# Patient Record
Sex: FEMALE | Race: OTHER | NOT HISPANIC OR LATINO | URBAN - METROPOLITAN AREA
[De-identification: names, ages, dates, MRNs, and addresses within clinical notes are randomized per-mention and may not be internally consistent; named-entity substitution may affect disease eponyms.]

---

## 2017-10-24 ENCOUNTER — EMERGENCY (EMERGENCY)
Facility: HOSPITAL | Age: 27
LOS: 1 days | Discharge: HOME | End: 2017-10-24
Attending: EMERGENCY MEDICINE | Admitting: EMERGENCY MEDICINE
Payer: COMMERCIAL

## 2017-10-24 VITALS
HEART RATE: 102 BPM | DIASTOLIC BLOOD PRESSURE: 97 MMHG | TEMPERATURE: 99 F | SYSTOLIC BLOOD PRESSURE: 102 MMHG | OXYGEN SATURATION: 97 % | RESPIRATION RATE: 20 BRPM

## 2017-10-24 VITALS
RESPIRATION RATE: 18 BRPM | HEART RATE: 132 BPM | DIASTOLIC BLOOD PRESSURE: 74 MMHG | TEMPERATURE: 97 F | OXYGEN SATURATION: 100 % | SYSTOLIC BLOOD PRESSURE: 108 MMHG

## 2017-10-24 DIAGNOSIS — Z79.2 LONG TERM (CURRENT) USE OF ANTIBIOTICS: ICD-10-CM

## 2017-10-24 DIAGNOSIS — R06.00 DYSPNEA, UNSPECIFIED: ICD-10-CM

## 2017-10-24 DIAGNOSIS — R06.02 SHORTNESS OF BREATH: ICD-10-CM

## 2017-10-24 LAB
ALBUMIN SERPL ELPH-MCNC: 4.2 G/DL — SIGNIFICANT CHANGE UP (ref 3.3–5)
ALP SERPL-CCNC: 49 U/L — SIGNIFICANT CHANGE UP (ref 40–120)
ALT FLD-CCNC: 13 U/L — SIGNIFICANT CHANGE UP (ref 10–45)
ANION GAP SERPL CALC-SCNC: 21 MMOL/L — HIGH (ref 5–17)
APPEARANCE UR: CLEAR — SIGNIFICANT CHANGE UP
APTT BLD: 32.9 SEC — SIGNIFICANT CHANGE UP (ref 27.5–37.4)
AST SERPL-CCNC: 19 U/L — SIGNIFICANT CHANGE UP (ref 10–40)
BASOPHILS NFR BLD AUTO: 1 % — SIGNIFICANT CHANGE UP (ref 0–2)
BILIRUB SERPL-MCNC: <0.2 MG/DL — SIGNIFICANT CHANGE UP (ref 0.2–1.2)
BILIRUB UR-MCNC: NEGATIVE — SIGNIFICANT CHANGE UP
BUN SERPL-MCNC: 16 MG/DL — SIGNIFICANT CHANGE UP (ref 7–23)
CALCIUM SERPL-MCNC: 9.6 MG/DL — SIGNIFICANT CHANGE UP (ref 8.4–10.5)
CHLORIDE SERPL-SCNC: 96 MMOL/L — SIGNIFICANT CHANGE UP (ref 96–108)
CK SERPL-CCNC: 115 U/L — SIGNIFICANT CHANGE UP (ref 25–170)
CO2 SERPL-SCNC: 17 MMOL/L — LOW (ref 22–31)
COLOR SPEC: YELLOW — SIGNIFICANT CHANGE UP
CREAT SERPL-MCNC: 0.93 MG/DL — SIGNIFICANT CHANGE UP (ref 0.5–1.3)
D DIMER BLD IA.RAPID-MCNC: <150 NG/ML DDU — SIGNIFICANT CHANGE UP
DIFF PNL FLD: (no result)
EOSINOPHIL NFR BLD AUTO: 1 % — SIGNIFICANT CHANGE UP (ref 0–6)
GAS PNL BLDV: SIGNIFICANT CHANGE UP
GLUCOSE SERPL-MCNC: 126 MG/DL — HIGH (ref 70–99)
GLUCOSE UR QL: NEGATIVE — SIGNIFICANT CHANGE UP
HCT VFR BLD CALC: 36.7 % — SIGNIFICANT CHANGE UP (ref 34.5–45)
HGB BLD-MCNC: 12.4 G/DL — SIGNIFICANT CHANGE UP (ref 11.5–15.5)
INR BLD: 1.04 — SIGNIFICANT CHANGE UP (ref 0.88–1.16)
KETONES UR-MCNC: NEGATIVE — SIGNIFICANT CHANGE UP
LEUKOCYTE ESTERASE UR-ACNC: NEGATIVE — SIGNIFICANT CHANGE UP
LYMPHOCYTES # BLD AUTO: 78 % — HIGH (ref 13–44)
MCHC RBC-ENTMCNC: 29 PG — SIGNIFICANT CHANGE UP (ref 27–34)
MCHC RBC-ENTMCNC: 33.8 G/DL — SIGNIFICANT CHANGE UP (ref 32–36)
MCV RBC AUTO: 85.7 FL — SIGNIFICANT CHANGE UP (ref 80–100)
MONOCYTES NFR BLD AUTO: 5 % — SIGNIFICANT CHANGE UP (ref 2–14)
NEUTROPHILS NFR BLD AUTO: 15 % — LOW (ref 43–77)
NITRITE UR-MCNC: NEGATIVE — SIGNIFICANT CHANGE UP
NT-PROBNP SERPL-SCNC: 27 PG/ML — SIGNIFICANT CHANGE UP (ref 0–300)
PCP SPEC-MCNC: SIGNIFICANT CHANGE UP
PH UR: 6 — SIGNIFICANT CHANGE UP (ref 5–8)
PLATELET # BLD AUTO: 244 K/UL — SIGNIFICANT CHANGE UP (ref 150–400)
POTASSIUM SERPL-MCNC: 3.5 MMOL/L — SIGNIFICANT CHANGE UP (ref 3.5–5.3)
POTASSIUM SERPL-SCNC: 3.5 MMOL/L — SIGNIFICANT CHANGE UP (ref 3.5–5.3)
PROT SERPL-MCNC: 8 G/DL — SIGNIFICANT CHANGE UP (ref 6–8.3)
PROT UR-MCNC: NEGATIVE MG/DL — SIGNIFICANT CHANGE UP
PROTHROM AB SERPL-ACNC: 11.5 SEC — SIGNIFICANT CHANGE UP (ref 9.8–12.7)
RBC # BLD: 4.28 M/UL — SIGNIFICANT CHANGE UP (ref 3.8–5.2)
RBC # FLD: 12.6 % — SIGNIFICANT CHANGE UP (ref 10.3–16.9)
SODIUM SERPL-SCNC: 134 MMOL/L — LOW (ref 135–145)
SP GR SPEC: <=1.005 — SIGNIFICANT CHANGE UP (ref 1–1.03)
TROPONIN T SERPL-MCNC: <0.01 NG/ML — SIGNIFICANT CHANGE UP (ref 0–0.01)
TSH SERPL-MCNC: 1.22 UIU/ML — SIGNIFICANT CHANGE UP (ref 0.35–4.94)
UROBILINOGEN FLD QL: 0.2 E.U./DL — SIGNIFICANT CHANGE UP
WBC # BLD: 7.6 K/UL — SIGNIFICANT CHANGE UP (ref 3.8–10.5)
WBC # FLD AUTO: 7.6 K/UL — SIGNIFICANT CHANGE UP (ref 3.8–10.5)

## 2017-10-24 PROCEDURE — 71010: CPT | Mod: 26

## 2017-10-24 PROCEDURE — 96375 TX/PRO/DX INJ NEW DRUG ADDON: CPT

## 2017-10-24 PROCEDURE — 82550 ASSAY OF CK (CPK): CPT

## 2017-10-24 PROCEDURE — 85610 PROTHROMBIN TIME: CPT

## 2017-10-24 PROCEDURE — 99285 EMERGENCY DEPT VISIT HI MDM: CPT | Mod: 25

## 2017-10-24 PROCEDURE — 82330 ASSAY OF CALCIUM: CPT

## 2017-10-24 PROCEDURE — 71045 X-RAY EXAM CHEST 1 VIEW: CPT

## 2017-10-24 PROCEDURE — 82803 BLOOD GASES ANY COMBINATION: CPT

## 2017-10-24 PROCEDURE — 96374 THER/PROPH/DIAG INJ IV PUSH: CPT

## 2017-10-24 PROCEDURE — 80053 COMPREHEN METABOLIC PANEL: CPT

## 2017-10-24 PROCEDURE — 71010: CPT | Mod: 26,NC

## 2017-10-24 PROCEDURE — 83880 ASSAY OF NATRIURETIC PEPTIDE: CPT

## 2017-10-24 PROCEDURE — 84132 ASSAY OF SERUM POTASSIUM: CPT

## 2017-10-24 PROCEDURE — 84295 ASSAY OF SERUM SODIUM: CPT

## 2017-10-24 PROCEDURE — 81001 URINALYSIS AUTO W/SCOPE: CPT

## 2017-10-24 PROCEDURE — 85025 COMPLETE CBC W/AUTO DIFF WBC: CPT

## 2017-10-24 PROCEDURE — 36415 COLL VENOUS BLD VENIPUNCTURE: CPT

## 2017-10-24 PROCEDURE — 93010 ELECTROCARDIOGRAM REPORT: CPT | Mod: NC

## 2017-10-24 PROCEDURE — 84484 ASSAY OF TROPONIN QUANT: CPT

## 2017-10-24 PROCEDURE — 85730 THROMBOPLASTIN TIME PARTIAL: CPT

## 2017-10-24 PROCEDURE — 85379 FIBRIN DEGRADATION QUANT: CPT

## 2017-10-24 PROCEDURE — 80307 DRUG TEST PRSMV CHEM ANLYZR: CPT

## 2017-10-24 PROCEDURE — 99284 EMERGENCY DEPT VISIT MOD MDM: CPT | Mod: 25

## 2017-10-24 PROCEDURE — 84443 ASSAY THYROID STIM HORMONE: CPT

## 2017-10-24 PROCEDURE — 93005 ELECTROCARDIOGRAM TRACING: CPT

## 2017-10-24 RX ORDER — FAMOTIDINE 10 MG/ML
20 INJECTION INTRAVENOUS ONCE
Qty: 0 | Refills: 0 | Status: COMPLETED | OUTPATIENT
Start: 2017-10-24 | End: 2017-10-24

## 2017-10-24 RX ORDER — SODIUM CHLORIDE 9 MG/ML
2000 INJECTION INTRAMUSCULAR; INTRAVENOUS; SUBCUTANEOUS ONCE
Qty: 0 | Refills: 0 | Status: COMPLETED | OUTPATIENT
Start: 2017-10-24 | End: 2017-10-24

## 2017-10-24 RX ORDER — SODIUM CHLORIDE 9 MG/ML
3 INJECTION INTRAMUSCULAR; INTRAVENOUS; SUBCUTANEOUS ONCE
Qty: 0 | Refills: 0 | Status: COMPLETED | OUTPATIENT
Start: 2017-10-24 | End: 2017-10-24

## 2017-10-24 RX ORDER — DIPHENHYDRAMINE HCL 50 MG
50 CAPSULE ORAL ONCE
Qty: 0 | Refills: 0 | Status: COMPLETED | OUTPATIENT
Start: 2017-10-24 | End: 2017-10-24

## 2017-10-24 RX ADMIN — Medication 125 MILLIGRAM(S): at 21:21

## 2017-10-24 RX ADMIN — SODIUM CHLORIDE 2000 MILLILITER(S): 9 INJECTION INTRAMUSCULAR; INTRAVENOUS; SUBCUTANEOUS at 21:05

## 2017-10-24 RX ADMIN — Medication 50 MILLIGRAM(S): at 21:20

## 2017-10-24 RX ADMIN — SODIUM CHLORIDE 3 MILLILITER(S): 9 INJECTION INTRAMUSCULAR; INTRAVENOUS; SUBCUTANEOUS at 21:00

## 2017-10-24 RX ADMIN — FAMOTIDINE 20 MILLIGRAM(S): 10 INJECTION INTRAVENOUS at 21:20

## 2017-10-24 NOTE — ED PROVIDER NOTE - PROGRESS NOTE DETAILS
pt states her sob is improved after benadryl/steroids/pepcid. however, still tachycardic, given IV Fluids. upon further questioning; admits to possible thc usage. + utox thc positive, labs otherwise unremarkable, will repeat vs and discharge home. thc positive, labs otherwise unremarkable, will repeat vs and discharge home. low co2 likely due to hyperventilation due to anxiety.

## 2017-10-24 NOTE — ED ADULT NURSE NOTE - OBJECTIVE STATEMENT
Pt c/o sudden onset SOB with chest tightness and "a feeling like I cant catch my breath." Pt also reports "my lips feel funny and my throat." Lungs clear to auscultation. Pt placed on cardiac monitor, pulse ox, EKG performed, will monitor and maintain safety. Family at bedside.

## 2017-10-24 NOTE — ED PROVIDER NOTE - OBJECTIVE STATEMENT
27F visiting from [place] presenting with sudden onset palpitations, difficulty breathing while eating dinner, was having lobster, felt perioral tingling. States she has chest pressure, on IUD. Pt states she feels anxious. Had chocolate before dinner, has had chocolate before. Thinks chocolate may have been mixed up with mother's chocolate which contains THC (mother has CA). No leg swelling, recent travel from . No malignancy hx, no recent surg. No lip/tongue swelling, no rash. No itchiness. Pt states she thinks throat is closing.

## 2017-10-24 NOTE — ED PROVIDER NOTE - MEDICAL DECISION MAKING DETAILS
27F with chest pain, shortness of breath, sudden onset while eating dinner, initial concern for allergic rxn and treated with benadryl, solumedrol, pepcid as pt described lip/tongue tingling while eating lobster. However, no rash to skin, no n/v/d, no lip/tongue swelling. Stated throat closed although oropharynx wnl and speaking in full sentences. Lungs clear doubt asthma, doubt PE, pt has IUD but given recent travel, sent d-dimer neg acute. Pt's EKG shows sinus tach only troponin negative. Doubt PTX, neg CXR. Doubt PE neg d-dimer. TSH wnl. After 1-2 hours in ED, pt's boyfriend states that potentially chocolate was laced with THC, + UTox found. Likely intoxication. No indication for further testing, given IV fluids with improvement of tachycardia, no longer having cp/sob.     Will dc home to f/u with PCP.

## 2017-10-24 NOTE — ED PROVIDER NOTE - ENMT, MLM
Airway patent, Nasal mucosa clear. Mouth with normal mucosa. no lip/tongue swelling, oropharynx clear talking in full sentences

## 2024-09-04 ENCOUNTER — APPOINTMENT (OUTPATIENT)
Dept: SURGERY | Facility: CLINIC | Age: 34
End: 2024-09-04
Payer: COMMERCIAL

## 2024-09-04 VITALS — TEMPERATURE: 98.3 F | DIASTOLIC BLOOD PRESSURE: 71 MMHG | HEART RATE: 80 BPM | SYSTOLIC BLOOD PRESSURE: 105 MMHG

## 2024-09-04 DIAGNOSIS — G43.909 MIGRAINE, UNSPECIFIED, NOT INTRACTABLE, W/OUT STATUS MIGRAINOSUS: ICD-10-CM

## 2024-09-04 DIAGNOSIS — Z86.2 PERSONAL HISTORY OF DISEASES OF THE BLOOD AND BLOOD-FORMING ORGANS AND CERTAIN DISORDERS INVOLVING THE IMMUNE MECHANISM: ICD-10-CM

## 2024-09-04 DIAGNOSIS — Z86.69 PERSONAL HISTORY OF OTHER DISEASES OF THE NERVOUS SYSTEM AND SENSE ORGANS: ICD-10-CM

## 2024-09-04 DIAGNOSIS — Z86.018 PERSONAL HISTORY OF OTHER BENIGN NEOPLASM: ICD-10-CM

## 2024-09-04 DIAGNOSIS — E04.1 NONTOXIC SINGLE THYROID NODULE: ICD-10-CM

## 2024-09-04 PROCEDURE — 99203 OFFICE O/P NEW LOW 30 MIN: CPT

## 2024-09-04 RX ORDER — THYROID, PORCINE 15 MG/1
15 TABLET ORAL
Refills: 0 | Status: ACTIVE | COMMUNITY

## 2024-09-04 RX ORDER — SAW/PYGEUM/BETA/HERB/D3/B6/ZN 30 MG-25MG
CAPSULE ORAL
Refills: 0 | Status: ACTIVE | COMMUNITY

## 2024-09-04 NOTE — PHYSICAL EXAM
[Respiratory Effort] : normal respiratory effort [Normal Rate and Rhythm] : normal rate and rhythm [No Rash or Lesion] : No rash or lesion [Calm] : calm [de-identified] : NAD, well-appearing [de-identified] : anicteric [de-identified] : speaking in a normal voice; bedside US with difficulty in visualizing described nodule vs LNs, but no suspicious masses seen [de-identified] : soft, nondistended

## 2024-09-04 NOTE — HISTORY OF PRESENT ILLNESS
[de-identified] : 32 yo F with h/o Hashimoto's thyroiditis on [place] thyroid, presents for findings of a nodule on US. She reports having symptoms of internal tremors in her arms, legs, neck. She was concerned it was from her thyroid so she had US done. This showed a  4x4mm TR4 nodule in the RUP. There was no lymphadenopathy. Patient has no family history of endocrine disease. She speaks in a normal voice. She recently relocated from [place]. Her last available labs from 8/14/24 show a TSH of 0.87 with fT4 of 1.0.

## 2024-09-04 NOTE — ASSESSMENT
[FreeTextEntry1] : 34 yo F with 4mm thyroid nodule in setting of Hashimoto's thyroiditis.  We had a long discussion about thyroid anatomy, physiology and pathophysiology. We discussed the role of fine needle aspiration biopsies. Given the size of the nodule, I do not recommend FNA at this time. Recommend monitoring with serial US and FNA once size is 1cm or greater. Also referred to local endocrinologist as she had a virtual MD only and she needs assistance in titrating thyroid medication to alleviate symptoms. Follow up 6 months with repeat US.

## 2024-11-01 NOTE — ED PROVIDER NOTE - X-RAY INTERPRETATION
Patient is a 48 y.o. female who is here for a physical.  Chief Complaint   Patient presents with    Annual Exam         HPI:    Here for CPE.     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       Past Surgical History:   Procedure Laterality Date    COLONOSCOPY      REDUCTION MAMMAPLASTY Bilateral 2010    TOTAL LAPAROSCOPIC HYSTERECTOMY SALPINGECTOMY N/A 01/18/2023    Procedure: TOTAL LAPAROSCOPIC HYSTERECTOMY BILATERAL SALPINGECTOMY;  Surgeon: Promise Dewitt MD;  Location: [facility];  Service: Obstetrics/Gynecology;  Laterality: N/A;    VAGINAL HYSTERECTOMY  1/18/23    With bilateral salpingectomy    WISDOM TOOTH EXTRACTION  1994       Current Outpatient Medications on File Prior to Visit   Medication Sig    amphetamine-dextroamphetamine XR (Adderall XR) 10 MG 24 hr capsule Take 1 capsule by mouth Every Morning    Cholecalciferol (Vitamin D) 50 MCG (2000 UT) tablet Take 1 tablet by mouth Daily.    clobetasol (TEMOVATE) 0.05 % ointment Apply 1 Application topically to the appropriate area as directed 2 (Two) Times a Day As Needed (RASH).    escitalopram (Lexapro) 5 MG tablet Take 1 tablet by mouth Daily.    ibuprofen (ADVIL,MOTRIN) 600 MG tablet Take 1 tablet by mouth Every 6 (Six) Hours As Needed for Mild Pain.    traZODone (DESYREL) 50 MG tablet Take 1 tablet by mouth At Night As Needed for Sleep.     No current facility-administered medications on file prior to visit.       Family History   Problem Relation Age of Onset    Diabetes Father     Heart attack Father     Coronary artery disease Father         MI age 73    Crohn's disease Father     Breast  "cancer Neg Hx     Ovarian cancer Neg Hx     Endometrial cancer Neg Hx        Social History     Socioeconomic History    Marital status:    Tobacco Use    Smoking status: Never    Smokeless tobacco: Never   Vaping Use    Vaping status: Never Used   Substance and Sexual Activity    Alcohol use: Yes     Comment: RARE    Drug use: No    Sexual activity: Yes     Partners: Male     Birth control/protection: Condom, OCP         Review of Systems   Constitutional:  Negative for chills, fatigue, fever and unexpected weight change.   HENT:  Negative for congestion, ear pain, hearing loss, rhinorrhea, sinus pressure, sore throat and trouble swallowing.    Eyes:  Negative for discharge and itching.   Respiratory:  Negative for cough, chest tightness and shortness of breath.    Cardiovascular:  Negative for chest pain, palpitations and leg swelling.   Gastrointestinal:  Negative for abdominal pain, blood in stool, constipation and vomiting.        2019 colonoscopy normal   Endocrine: Negative for polydipsia and polyuria.   Genitourinary:  Negative for difficulty urinating, dysuria, enuresis, frequency, hematuria and urgency.        4/22 mammogram   Musculoskeletal:  Negative for arthralgias, back pain, gait problem and joint swelling.   Skin:  Negative for rash and wound.   Allergic/Immunologic: Positive for environmental allergies. Negative for immunocompromised state.   Neurological:  Negative for dizziness, syncope, weakness, light-headedness, numbness and headaches.   Hematological:  Does not bruise/bleed easily.   Psychiatric/Behavioral:  Positive for decreased concentration (better) and dysphoric mood (better). Negative for behavioral problems and sleep disturbance. The patient is nervous/anxious (better).        /64   Pulse 68   Ht 167.6 cm (65.98\")   Wt 54 kg (119 lb)   LMP 01/04/2023 (Approximate)   SpO2 99%   BMI 19.22 kg/m²       Physical Exam  Constitutional:       Appearance: Normal appearance. " She is well-developed.   HENT:      Head: Normocephalic and atraumatic.      Right Ear: External ear normal.      Left Ear: External ear normal.      Nose: Nose normal.      Mouth/Throat:      Mouth: Mucous membranes are moist.      Pharynx: Oropharynx is clear.   Eyes:      Extraocular Movements: Extraocular movements intact.      Conjunctiva/sclera: Conjunctivae normal.      Pupils: Pupils are equal, round, and reactive to light.   Cardiovascular:      Rate and Rhythm: Normal rate and regular rhythm.      Pulses: Normal pulses.      Heart sounds: Normal heart sounds.   Pulmonary:      Effort: Pulmonary effort is normal.      Breath sounds: Normal breath sounds.   Abdominal:      General: Abdomen is flat. Bowel sounds are normal.      Palpations: Abdomen is soft.   Musculoskeletal:         General: Normal range of motion.      Cervical back: Normal range of motion and neck supple.   Lymphadenopathy:      Cervical: No cervical adenopathy.   Skin:     General: Skin is warm and dry.   Neurological:      General: No focal deficit present.      Mental Status: She is alert and oriented to person, place, and time.   Psychiatric:         Mood and Affect: Mood normal.         Behavior: Behavior normal.         Thought Content: Thought content normal.      Comments: tearful         Procedure:      Historical Data:    HME-counseled on diet and exercise  Poor concentration-labs today, Adult ADHD self-report scale note, patient was on low dose Adderal in the past with good results.   Situational anxiety-good control with Lexapro     Reviewed the following with the patient: advised patient to avoid alcoholic beverages and encouraged patient to exercise 5-7 days per week for 30 minutes at a time.           Current Outpatient Medications:     amphetamine-dextroamphetamine XR (Adderall XR) 10 MG 24 hr capsule, Take 1 capsule by mouth Every Morning, Disp: 30 capsule, Rfl: 0    Cholecalciferol (Vitamin D) 50 MCG (2000 UT) tablet,  Take 1 tablet by mouth Daily., Disp: , Rfl:     clobetasol (TEMOVATE) 0.05 % ointment, Apply 1 Application topically to the appropriate area as directed 2 (Two) Times a Day As Needed (RASH)., Disp: 60 g, Rfl: 5    escitalopram (Lexapro) 5 MG tablet, Take 1 tablet by mouth Daily., Disp: 30 tablet, Rfl: 5    ibuprofen (ADVIL,MOTRIN) 600 MG tablet, Take 1 tablet by mouth Every 6 (Six) Hours As Needed for Mild Pain., Disp: 30 tablet, Rfl: 1    traZODone (DESYREL) 50 MG tablet, Take 1 tablet by mouth At Night As Needed for Sleep., Disp: 30 tablet, Rfl: 5        Diagnoses and all orders for this visit:    1. Routine general medical examination at a health care facility (Primary)  -     CBC (No Diff)  -     Comprehensive Metabolic Panel  -     Lipid Panel  -     TSH  -     Vitamin B12  -     POC Urinalysis Dipstick, Automated    2. Poor concentration    3. Situational insomnia    4. Situational mixed anxiety and depressive disorder         no ptx, no consolidation trachea midline/ER physician